# Patient Record
Sex: FEMALE | Race: OTHER | URBAN - METROPOLITAN AREA
[De-identification: names, ages, dates, MRNs, and addresses within clinical notes are randomized per-mention and may not be internally consistent; named-entity substitution may affect disease eponyms.]

---

## 2019-09-30 ENCOUNTER — OFFICE VISIT (OUTPATIENT)
Dept: FAMILY MEDICINE CLINIC | Age: 39
End: 2019-09-30

## 2019-09-30 VITALS
TEMPERATURE: 98.7 F | SYSTOLIC BLOOD PRESSURE: 106 MMHG | RESPIRATION RATE: 18 BRPM | WEIGHT: 147 LBS | HEIGHT: 64 IN | HEART RATE: 55 BPM | BODY MASS INDEX: 25.1 KG/M2 | OXYGEN SATURATION: 98 % | DIASTOLIC BLOOD PRESSURE: 69 MMHG

## 2019-09-30 DIAGNOSIS — B35.4 TINEA CORPORIS: ICD-10-CM

## 2019-09-30 DIAGNOSIS — Z00.00 PHYSICAL EXAM: Primary | ICD-10-CM

## 2019-09-30 DIAGNOSIS — Z12.39 SCREENING FOR BREAST CANCER: ICD-10-CM

## 2019-09-30 DIAGNOSIS — L30.8 OTHER ECZEMA: ICD-10-CM

## 2019-09-30 RX ORDER — NORETHINDRONE ACETATE AND ETHINYL ESTRADIOL 1.5; 3 MG/1; UG/1
1 TABLET ORAL DAILY
COMMUNITY
Start: 2019-09-23 | End: 2020-07-14 | Stop reason: SDUPTHER

## 2019-09-30 NOTE — PROGRESS NOTES
1. Have you been to the ER, urgent care clinic since your last visit? Hospitalized since your last visit? No    2. Have you seen or consulted any other health care providers outside of the [address] [place] [address] [place] since your last visit? Include any pap smears or colon screening.  Previous PCP for eczema and dermatology    Chief Complaint   Patient presents with   [address]     PCP

## 2019-09-30 NOTE — PROGRESS NOTES
Chief Complaint   Patient presents with   [address]     PCP     she is a 43y.o. year old female who presents for evalution. She is here to establish care  Not sick    Rash on left arm, riht shoulder and left buttock  Itched , used HC cream, stopped itching but not going away    Also c/o feet hurt/ache when walking        Reviewed PmHx, RxHx, FmHx, SocHx, AllgHx and updated and dated in the chart. Aspirin yes ____   No____ N/A____    There are no active problems to display for this patient.       Nurse notes were reviewed and copied and are correct  Review of Systems - negative except as listed above in the HPI    Objective:     Vitals:    09/30/19 1421   BP: 106/69   Pulse: (!) 55   Resp: 18   Temp: 98.7 °F (37.1 °C)   TempSrc: Oral   SpO2: 98%   Weight: 147 lb (66.7 kg)   Height: 5' 4\" (1.626 m)       Physical Examination: General appearance - alert, well appearing, and in no distress and oriented to person, place, and time  Mental status - alert, oriented to person, place, and time  Eyes - pupils equal and reactive, extraocular eye movements intact  Ears - bilateral TM's and external ear canals normal  Mouth - mucous membranes moist, pharynx normal without lesions  Neck - supple, no significant adenopathy  Chest - clear to auscultation, no wheezes, rales or rhonchi, symmetric air entry  Heart - normal rate, regular rhythm, normal S1, S2, no murmurs, rubs, clicks or gallops  Abdomen - soft, nontender, nondistended, no masses or organomegaly  Neurological - alert, oriented, normal speech, no focal findings or movement disorder noted  Musculoskeletal - no joint tenderness, deformity or swelling  Extremities - peripheral pulses normal, no pedal edema, no clubbing or cyanosis  Skin - normal coloration and turgor, several rashes- right shoulder w hyperpigmented scaly lesion covering most of the shoulder anteriorly, left elbow w circular well circumscribed raised brown plaque, and left buttock with hyperpigmented oval shaped lesion with darker center    Assessment/ Plan:   Diagnoses and all orders for this visit:    1. Physical exam  -     LIPID PANEL  -     METABOLIC PANEL, COMPREHENSIVE    2. Screening for breast cancer  -     ROSA MAMMO BI SCREENING INCL CAD; Future    3. Other eczema    4. Tinea corporis    nizoral cream bid  rto if not gone in 2 weeks       ICD-10-CM ICD-9-CM    1. Physical exam Z00.00 V70.9 LIPID PANEL      METABOLIC PANEL, COMPREHENSIVE   2. Screening for breast cancer Z12.31 V76.10 ROSA MAMMO BI SCREENING INCL CAD   3. Other eczema L30.8 692.9    4. Tinea corporis B35.4 110.5        I have discussed the diagnosis with the patient and the intended plan as seen in the above orders. The patient has received an after-visit summary and questions were answered concerning future plans. Medication Side Effects and Warnings were discussed with patient: yes  Patient Labs were reviewed and or requested: yes  Patient Past Records were reviewed and or requested: yes        There are no Patient Instructions on file for this visit.     The patient verbalizes understanding and agrees with the plan of care        Patient has the advanced directives booklet to review

## 2019-10-01 ENCOUNTER — TELEPHONE (OUTPATIENT)
Dept: FAMILY MEDICINE CLINIC | Age: 39
End: 2019-10-01

## 2019-10-01 DIAGNOSIS — L30.8 OTHER ECZEMA: Primary | ICD-10-CM

## 2019-10-01 LAB
ALBUMIN SERPL-MCNC: 4.7 G/DL (ref 3.5–5.5)
ALBUMIN/GLOB SERPL: 1.7 {RATIO} (ref 1.2–2.2)
ALP SERPL-CCNC: 50 IU/L (ref 39–117)
ALT SERPL-CCNC: 12 IU/L (ref 0–32)
AST SERPL-CCNC: 16 IU/L (ref 0–40)
BILIRUB SERPL-MCNC: 0.4 MG/DL (ref 0–1.2)
BUN SERPL-MCNC: 9 MG/DL (ref 6–20)
BUN/CREAT SERPL: 13 (ref 9–23)
CALCIUM SERPL-MCNC: 9.8 MG/DL (ref 8.7–10.2)
CHLORIDE SERPL-SCNC: 96 MMOL/L (ref 96–106)
CHOLEST SERPL-MCNC: 244 MG/DL (ref 100–199)
CO2 SERPL-SCNC: 18 MMOL/L (ref 20–29)
CREAT SERPL-MCNC: 0.7 MG/DL (ref 0.57–1)
GLOBULIN SER CALC-MCNC: 2.7 G/DL (ref 1.5–4.5)
GLUCOSE SERPL-MCNC: 80 MG/DL (ref 65–99)
HDLC SERPL-MCNC: 75 MG/DL
INTERPRETATION, 910389: NORMAL
LDLC SERPL CALC-MCNC: 158 MG/DL (ref 0–99)
POTASSIUM SERPL-SCNC: 4.2 MMOL/L (ref 3.5–5.2)
PROT SERPL-MCNC: 7.4 G/DL (ref 6–8.5)
SODIUM SERPL-SCNC: 135 MMOL/L (ref 134–144)
TRIGL SERPL-MCNC: 56 MG/DL (ref 0–149)
VLDLC SERPL CALC-MCNC: 11 MG/DL (ref 5–40)

## 2019-10-01 RX ORDER — KETOCONAZOLE 20 MG/G
CREAM TOPICAL 2 TIMES DAILY
Qty: 45 G | Refills: 0 | Status: SHIPPED | OUTPATIENT
Start: 2019-10-01

## 2019-10-01 NOTE — TELEPHONE ENCOUNTER
LVM advising pt requested Medication Nizoral 2% cream had been sent this morning via e-scribe to pharmacy on file. Advised pt if any questions to contact office back.

## 2019-10-01 NOTE — TELEPHONE ENCOUNTER
----- Message from Tej Wolff sent at 10/1/2019  7:50 AM EDT -----  Regarding: FW: Dr. Branda Prader      ----- Message -----  From: Juani Kemp  Sent: 9/30/2019   6:12 PM EDT  To: [facility] Front Office  Subject: Dr. Kim Hinojosa Message/Vendor Calls    Caller's first and last name:  Jayme Olivia      Reason for call:  Pt stated she went to [facility] at [address] and her prescription for [employer] generic had not been received by the pharmacy. Callback required yes/no and why:  Yes. Pt would like to know when prescription has been sent to pharmacy.       Best contact number(s):  [phone]      Details to clarify the request:      Carlos Manuel Zaidi

## 2019-10-15 NOTE — PROGRESS NOTES
The LDL cholesterol is 158 and the goal is less than 100  The kidney and liver are normal  We will repeat in 6 months

## 2019-10-15 NOTE — PROGRESS NOTES
Pt aware of lab results/recommendaitons via [address] [place] [address] [address]. Pt requested to have a copy of labs mailed to address on file. Pt had no further questions.

## 2020-07-13 ENCOUNTER — VIRTUAL VISIT (OUTPATIENT)
Dept: FAMILY MEDICINE CLINIC | Age: 40
End: 2020-07-13

## 2020-07-13 DIAGNOSIS — Z30.9 ENCOUNTER FOR CONTRACEPTIVE MANAGEMENT, UNSPECIFIED TYPE: Primary | ICD-10-CM

## 2020-07-13 RX ORDER — BETAMETHASONE VALERATE 1.2 MG/G
CREAM TOPICAL 2 TIMES DAILY
COMMUNITY

## 2020-07-13 RX ORDER — ALCLOMETASONE DIPROPIONATE 0.5 MG/G
CREAM TOPICAL 2 TIMES DAILY
COMMUNITY

## 2020-07-13 NOTE — PROGRESS NOTES
Anish Phelan is a 35 y.o. female who was seen by synchronous (real-time) audio-video technology on 7/13/2020 for Medication Refill    Last pap June 2017  Wants refill on OCP    and last pap 2-3 years ago  All paps normal    Assessment & Plan:   Diagnoses and all orders for this visit:    1. Encounter for contraceptive management, unspecified type  -     Microgestin 1.5/30, 21, 1.5-30 mg-mcg tab; Take 1 Tab by mouth daily. Refer to gyn for further management  I cannot give a years worth due to the [facility] policy      Subjective:       Prior to Admission medications    Medication Sig Start Date End Date Taking? Authorizing Provider   Microgestin 1.5/30, 21, 1.5-30 mg-mcg tab Take 1 Tab by mouth daily. 7/14/20  Yes Yvon Christine MD   betamethasone valerate (VALISONE) 0.1 % topical cream Apply  to affected area two (2) times a day. Yes Provider, Historical   alclometasone (ACLOVATE) 0.05 % topical cream Apply  to affected area two (2) times a day. apply thin layer as directed   Yes Provider, Historical   ketoconazole (NIZORAL) 2 % topical cream Apply  to affected area two (2) times a day. 10/1/19   Yvon Christine MD   [address] [place] [address] 1.5/30, 21, 1.5-30 mg-mcg tab Take 1 Tab by mouth daily. 9/23/19 7/14/20  Provider, Historical     There are no active problems to display for this patient. Current Outpatient Medications   Medication Sig Dispense Refill    Microgestin 1.5/30, 21, 1.5-30 mg-mcg tab Take 1 Tab by mouth daily. 3 Dose Pack 0    betamethasone valerate (VALISONE) 0.1 % topical cream Apply  to affected area two (2) times a day.  alclometasone (ACLOVATE) 0.05 % topical cream Apply  to affected area two (2) times a day. apply thin layer as directed      ketoconazole (NIZORAL) 2 % topical cream Apply  to affected area two (2) times a day.  45 g 0       ROS    Objective:     Patient-Reported Vitals 7/13/2020   Patient-Reported Weight 155lb   Patient-Reported Height 5'4 [INSTRUCTIONS:  \"[x]\" Indicates a positive item  \"[]\" Indicates a negative item  -- DELETE ALL ITEMS NOT EXAMINED]    Constitutional: [x] Appears well-developed and well-nourished [x] No apparent distress      [] Abnormal -     Mental status: [x] Alert and awake  [x] Oriented to person/place/time [x] Able to follow commands    [] Abnormal -     Eyes:   EOM    [x]  Normal    [] Abnormal -   Sclera  [x]  Normal    [] Abnormal -          Discharge [x]  None visible   [] Abnormal -     HENT: [x] Normocephalic, atraumatic  [] Abnormal -   [x] Mouth/Throat: Mucous membranes are moist    External Ears [x] Normal  [] Abnormal -    Neck: [x] No visualized mass [] Abnormal -     Pulmonary/Chest: [x] Respiratory effort normal   [x] No visualized signs of difficulty breathing or respiratory distress        [] Abnormal -      Musculoskeletal:   [x] Normal gait with no signs of ataxia         [x] Normal range of motion of neck        [] Abnormal -     Neurological:        [x] No Facial Asymmetry (Cranial nerve 7 motor function) (limited exam due to video visit)          [x] No gaze palsy        [] Abnormal -          Skin:        [x] No significant exanthematous lesions or discoloration noted on facial skin         [] Abnormal -            Psychiatric:       [x] Normal Affect [] Abnormal -        [x] No Hallucinations    Other pertinent observable physical exam findings:-        We discussed the expected course, resolution and complications of the diagnosis(es) in detail. Medication risks, benefits, costs, interactions, and alternatives were discussed as indicated. I advised her to contact the office if her condition worsens, changes or fails to improve as anticipated. She expressed understanding with the diagnosis(es) and plan.        Gloria Becky, who was evaluated through a patient-initiated, synchronous (real-time) audio-video encounter, and/or her healthcare decision maker, is aware that it is a billable service, with coverage as determined by her insurance carrier. She provided verbal consent to proceed: Yes, and patient identification was verified. It was conducted pursuant to the emergency declaration under the [address] [place] [address] authority and the [employer] General Act. A caregiver was present when appropriate. Ability to conduct physical exam was limited. I was at home. The patient was at home.       Cale Mott MD

## 2020-07-13 NOTE — PROGRESS NOTES
1. Have you been to the ER, urgent care clinic since your last visit? Hospitalized since your last visit? Yes When: 10/2020 Where: [facility] Reason for visit: Faint    2. Have you seen or consulted any other health care providers outside of the [address] [place] [address] since your last visit? Include any pap smears or colon screening.  No     Chief Complaint   Patient presents with    Medication Refill     Health Maintenance Due   Topic Date Due    Medicare Yearly Exam  07/10/2019    PAP AKA CERVICAL CYTOLOGY  06/26/2020

## 2020-07-14 RX ORDER — NORETHINDRONE ACETATE AND ETHINYL ESTRADIOL 1.5; 3 MG/1; UG/1
1 TABLET ORAL DAILY
Qty: 3 DOSE PACK | Refills: 0 | Status: SHIPPED | OUTPATIENT
Start: 2020-07-14

## 2020-09-01 ENCOUNTER — TELEPHONE (OUTPATIENT)
Dept: PRIMARY CARE CLINIC | Age: 40
End: 2020-09-01

## 2020-09-01 NOTE — TELEPHONE ENCOUNTER
[id number] Chantale carreon this is birth control and technically we are not suppose to be prescribing birth control any more.  She can find a gyn or set up an appt to discuss with Dr. Joann Gambino she hasn't been seen in well over a year

## 2023-05-24 RX ORDER — ALCLOMETASONE DIPROPIONATE 0.5 MG/G
CREAM TOPICAL 2 TIMES DAILY
COMMUNITY

## 2023-05-24 RX ORDER — NORETHINDRONE ACETATE AND ETHINYL ESTRADIOL .03; 1.5 MG/1; MG/1
1 TABLET ORAL DAILY
COMMUNITY
Start: 2020-07-14

## 2023-05-24 RX ORDER — KETOCONAZOLE 20 MG/G
CREAM TOPICAL 2 TIMES DAILY
COMMUNITY
Start: 2019-10-01